# Patient Record
Sex: MALE | Race: WHITE | NOT HISPANIC OR LATINO | URBAN - METROPOLITAN AREA
[De-identification: names, ages, dates, MRNs, and addresses within clinical notes are randomized per-mention and may not be internally consistent; named-entity substitution may affect disease eponyms.]

---

## 2017-11-14 ENCOUNTER — OFFICE VISIT - HEALTHEAST (OUTPATIENT)
Dept: FAMILY MEDICINE | Facility: CLINIC | Age: 27
End: 2017-11-14

## 2017-11-14 DIAGNOSIS — Z00.00 ROUTINE GENERAL MEDICAL EXAMINATION AT A HEALTH CARE FACILITY: ICD-10-CM

## 2017-11-14 DIAGNOSIS — Z13.0 SCREENING FOR DEFICIENCY ANEMIA: ICD-10-CM

## 2017-11-14 DIAGNOSIS — Z13.1 SCREENING FOR DIABETES MELLITUS: ICD-10-CM

## 2017-11-14 DIAGNOSIS — Z84.89 FAMILY HISTORY OF BRAIN TUMOR: ICD-10-CM

## 2017-11-14 DIAGNOSIS — Z13.220 SCREENING FOR CHOLESTEROL LEVEL: ICD-10-CM

## 2017-11-14 LAB
CHOLEST SERPL-MCNC: 150 MG/DL
FASTING STATUS PATIENT QL REPORTED: YES
HDLC SERPL-MCNC: 41 MG/DL
LDLC SERPL CALC-MCNC: 67 MG/DL
TRIGL SERPL-MCNC: 209 MG/DL

## 2017-11-14 ASSESSMENT — MIFFLIN-ST. JEOR: SCORE: 1944.42

## 2017-11-15 ENCOUNTER — COMMUNICATION - HEALTHEAST (OUTPATIENT)
Dept: FAMILY MEDICINE | Facility: CLINIC | Age: 27
End: 2017-11-15

## 2018-08-01 ENCOUNTER — OFFICE VISIT - HEALTHEAST (OUTPATIENT)
Dept: FAMILY MEDICINE | Facility: CLINIC | Age: 28
End: 2018-08-01

## 2018-08-01 DIAGNOSIS — D17.30 LIPOMA OF SKIN AND SUBCUTANEOUS TISSUE: ICD-10-CM

## 2018-08-01 ASSESSMENT — MIFFLIN-ST. JEOR: SCORE: 1982.98

## 2019-11-11 ENCOUNTER — COMMUNICATION - HEALTHEAST (OUTPATIENT)
Dept: SCHEDULING | Facility: CLINIC | Age: 29
End: 2019-11-11

## 2019-11-11 ENCOUNTER — OFFICE VISIT - HEALTHEAST (OUTPATIENT)
Dept: FAMILY MEDICINE | Facility: CLINIC | Age: 29
End: 2019-11-11

## 2019-11-11 DIAGNOSIS — M22.2X2 BILATERAL PATELLOFEMORAL SYNDROME: ICD-10-CM

## 2019-11-11 DIAGNOSIS — M22.2X1 BILATERAL PATELLOFEMORAL SYNDROME: ICD-10-CM

## 2019-11-11 RX ORDER — NAPROXEN 500 MG/1
500 TABLET ORAL 2 TIMES DAILY WITH MEALS
Qty: 60 TABLET | Refills: 1 | Status: SHIPPED | OUTPATIENT
Start: 2019-11-11

## 2019-11-11 ASSESSMENT — MIFFLIN-ST. JEOR: SCORE: 1952.36

## 2019-11-15 ENCOUNTER — COMMUNICATION - HEALTHEAST (OUTPATIENT)
Dept: FAMILY MEDICINE | Facility: CLINIC | Age: 29
End: 2019-11-15

## 2021-05-31 VITALS — WEIGHT: 218.5 LBS | BODY MASS INDEX: 31.28 KG/M2 | HEIGHT: 70 IN

## 2021-06-01 VITALS — BODY MASS INDEX: 32.5 KG/M2 | WEIGHT: 227 LBS | HEIGHT: 70 IN

## 2021-06-03 VITALS
WEIGHT: 220.25 LBS | DIASTOLIC BLOOD PRESSURE: 76 MMHG | RESPIRATION RATE: 20 BRPM | BODY MASS INDEX: 31.53 KG/M2 | TEMPERATURE: 98.4 F | HEART RATE: 64 BPM | OXYGEN SATURATION: 98 % | SYSTOLIC BLOOD PRESSURE: 116 MMHG | HEIGHT: 70 IN

## 2021-06-03 NOTE — TELEPHONE ENCOUNTER
Who is calling:  [facility]  Reason for Call:  Patient is scheduled to be seen and we need the referrals faxed to [phone]  Date of last appointment with primary care: 11/11/19  Okay to leave a detailed message: Yes

## 2021-06-03 NOTE — TELEPHONE ENCOUNTER
RN Triage:    Spoke with 29 yr old Troy who c/o:    Worsening pain in both knees x 2 weeks.    Denies injury.    Has tried [employer] joint function capsules, Ibuprofen and Tylenol without much relief.    Increasing pain yesterday and today.    Has woken up in the night to stand and move around for pain to go away.    Lingers all day.    Rates pain a 4-5 on a 0-10 pain scale.    Pain is mostly when bending the knee.    PLAN:  Advised OV within the next 3 days.  Pt scheduled for 6:00 pm today, 11/11/19 at the [facility] with Dr. Peoples.  Advised pt to call back if symptoms are worsening.    Jessica Gamez RN   [facility] RN Triage    Reason for Disposition    MODERATE pain (e.g., symptoms interfere with work or school, limping) and present > 3 days    Protocols used: KNEE PAIN-A-OH

## 2021-06-14 NOTE — PROGRESS NOTES
MALE PREVENTIVE EXAM    Assessment & Plan   1. Routine general medical examination at a health care facility:  Fasting labs    2. Family history of brain tumor:  Discussed factors that increase risk of meningioma.  Would be reasonable to discuss genetic screening for neurofibromatosis with his father and his providers.  He will do so and call back if he would like a referral for a genetic consult as well. He has no other characteristics of the syndrome though a first degree family member would be indication for screening.     3. Screening for cholesterol level  - Lipid Cascade    4. Screening for diabetes mellitus  - Glucose    5. Screening for deficiency anemia  - HM2(CBC w/o Differential)    Alcohol use, safety and moderation discussed.  Recommended adequate calcium intake/osteoporosis prevention.  Diet discuss, including moderation of portions sizes, avoiding eating out and fast food and increase in fruits and vegetables.  Discussed safe sex practices.  Discussed & recommended seat belt (& motorcycle helmet) use.      Faye Hoyt CNP    Subjective:   Chief Complaint:  Establish Care (new pt, possible physical )    HPI:  Troy Azevedo is a 27 y.o. male who presents for routine physical exam.  He is a new patient to the clinic.  Not seen elsewhere for some time.  PMH negative for chronic concern.      He states his dad has a history of benign meningioma with recurrence every couple of years. He is unaware of any genetic testing performed.  He denies personal history of skin lesions.  He is wondering about screening with brain imaging.  Denies symptoms.      Preventive Health:  Reviewed and recommended screening and treatment recommendations:  Immunizations: up to date    Health Habits:    Exercise: yes, cardio and resistance training 3x/week  Balanced Diet: yes  Seat Belt Use: yes  Calcium intake/Osteoporosis prevention: no  Guns: NO  Sun Screen: YES  Dental Care: YES    PMH:   There is no problem list on  "file for this patient.      No past medical history on file.    Current Medications: Reviewed  No current outpatient prescriptions on file prior to visit.     No current facility-administered medications on file prior to visit.        Allergies: Reviewed   has No Known Allergies.    Social History:  Social History     Occupational History     Not on file.     Social History Main Topics     Smoking status: Former Smoker     Types: Cigarettes     Quit date: 10/14/2016     Smokeless tobacco: Never Used     Alcohol use Yes      Comment: 2 times weekly      Drug use: No     Sexual activity: Yes     Partners: Female     Birth control/ protection: Implant      Comment: partner has birth control implant in arm        Family History:   No family history on file.      Review of Systems:  Complete head to toe review of systems is otherwise negative except as above.    Objective:    /72 (Patient Site: Left Arm, Patient Position: Sitting, Cuff Size: Adult Large)  Pulse 68  Resp 16  Ht 5' 9.5\" (1.765 m)  Wt 218 lb 8 oz (99.1 kg)  SpO2 97%  BMI 31.8 kg/m2    GENERAL: Alert, well-appearing male.   PSYCH: Pleasant mood, affect appropriate.    SKIN: No atypical lesions, benign appearing nevi  HEAD: Normocephalic, atraumatic  EYES: Conjunctiva pink, sclera white, no exudates. DEWAYNE.  EOMs intact. Corneal light reflex bilaterally, red reflex present. Undilated fundoscopic exam normal  EARS: TMs pearly grey, no bulging, redness, retraction.   MOUTH: Pharynx moist, pink without exudate. No tonsillar enlargement  NECK: No lymphadenopathy. Thyroid borders smooth without enlargement, nodules.   CV: Regular rate and rhythm without murmurs, rubs or gallops.  RESP: Lung sounds clear, symmetric excursion.   ABDOMEN: BS+. Abdomen soft, nontender to palpation without guarding. No organomegaly, masses or hernias  :  Normal scrotum.  Testes descended bilaterally without mass or hernia. Penis circumcised without lesions or " discharge.  MSK: Spine and extremities in alignment.   NEURO: Alert, oriented.   PV : No edema, tenderness or varicosities.

## 2021-06-26 NOTE — PROGRESS NOTES
"Progress Notes by Carin Leon MD at 8/1/2018  1:40 PM     Author: Carin Leon MD Service: -- Author Type: Physician    Filed: 8/2/2018  4:24 PM Encounter Date: 8/1/2018 Status: Signed    : Carin Leon MD (Physician)       OFFICE VISIT - FAMILY MEDICINE     ASSESSMENT AND PLAN     1. Lipoma of skin and subcutaneous tissue     Small about a centimeter skin lesion in the anterior and posterior left chest area, differential diagnosis discussed included lipoma or lipofibroma, will just continue observation and follow-up if they enlarge in size or become painful.    CHIEF COMPLAINT   Mass (X 10 DAYS)    HPI   Troy Azveedo is a 28 y.o. male.  No Patient Care Coordination Note on file.      A couple of bumpy lesion in the left chest area, noticed about a month ago, no pain or discomfort.  First 1 is localized in the anterior upper chest area on and the second posterior to that same area.  Numbness or tingling.  No similar lesion in order part of his body.    Review of Systems As per HPI, otherwise negative.    OBJECTIVE   /72 (Patient Site: Left Arm)  Pulse 64  Temp 98.7  F (37.1  C) (Oral)   Resp 13  Ht 5' 9.5\" (1.765 m)  Wt (!) 227 lb (103 kg)  BMI 33.04 kg/m2  Physical Exam   Constitutional: He appears well-developed and well-nourished.   obese   Skin:            PFSH     Family History   Problem Relation Age of Onset   ? No Medical Problems Mother    ? Brain cancer Father      meningiomas   ? Hypertension Paternal Grandfather      Social History     Social History   ? Marital status: Single     Spouse name: N/A   ? Number of children: N/A   ? Years of education: N/A     Occupational History   ? Not on file.     Social History Main Topics   ? Smoking status: Former Smoker     Types: Cigarettes     Quit date: 10/14/2016   ? Smokeless tobacco: Never Used   ? Alcohol use Yes      Comment: 2 times weekly    ? Drug use: No   ? Sexual activity: Yes     Partners: " Female     Birth control/ protection: Implant      Comment: partner has birth control implant in arm      Other Topics Concern   ? Not on file     Social History Narrative     Relevant history was reviewed with the patient today, unless noted in HPI, nothing is pertinent for this visit.  [facility]   There are no active problems to display for this patient.    Past Surgical History:   Procedure Laterality Date   ? TONSILLECTOMY         RESULTS/CONSULTS (Lab/Rad)   No results found for this or any previous visit (from the past 168 hour(s)).  No results found.  MEDICATIONS     No current outpatient prescriptions on file prior to visit.     No current facility-administered medications on file prior to visit.        HEALTH MAINTENANCE / SCREENING   PHQ-2 Total Score: 0 (11/14/2017  8:30 AM), No Data Recorded,No Data Recorded  Immunization History   Administered Date(s) Administered   ? Hep B, historic 12/14/2001, 01/18/2002, 05/24/2002   ? MMR 05/24/2002   ? Td,adult,historic,unspecified 02/16/2009     Health Maintenance   Topic   ? TDAP ADULT ONE TIME DOSE    ? ADVANCE DIRECTIVES DISCUSSED WITH PATIENT    ? INFLUENZA VACCINE RULE BASED (1)   ? TD 18+ HE        Carin Leon MD  Family Medicine, [facility]     This note was dictated using a voice recognition software.  Any grammatical or context distortion are unintentional and inherent to the software.

## 2021-08-22 ENCOUNTER — HEALTH MAINTENANCE LETTER (OUTPATIENT)
Age: 31
End: 2021-08-22

## 2021-10-17 ENCOUNTER — HEALTH MAINTENANCE LETTER (OUTPATIENT)
Age: 31
End: 2021-10-17

## 2022-10-01 ENCOUNTER — HEALTH MAINTENANCE LETTER (OUTPATIENT)
Age: 32
End: 2022-10-01

## 2023-10-21 ENCOUNTER — HEALTH MAINTENANCE LETTER (OUTPATIENT)
Age: 33
End: 2023-10-21